# Patient Record
Sex: FEMALE | Race: WHITE | Employment: UNEMPLOYED | URBAN - METROPOLITAN AREA
[De-identification: names, ages, dates, MRNs, and addresses within clinical notes are randomized per-mention and may not be internally consistent; named-entity substitution may affect disease eponyms.]

---

## 2017-02-28 ENCOUNTER — OFFICE VISIT (OUTPATIENT)
Dept: DERMATOLOGY | Age: 72
End: 2017-02-28

## 2017-02-28 DIAGNOSIS — L82.1 SK (SEBORRHEIC KERATOSIS): ICD-10-CM

## 2017-02-28 DIAGNOSIS — Z85.828 HISTORY OF BASAL CELL CARCINOMA: ICD-10-CM

## 2017-02-28 DIAGNOSIS — T07.XXXA MULTIPLE EXCORIATIONS: ICD-10-CM

## 2017-02-28 DIAGNOSIS — L57.0 AK (ACTINIC KERATOSIS): ICD-10-CM

## 2017-02-28 DIAGNOSIS — L21.9 SEBORRHEIC DERMATITIS: Primary | ICD-10-CM

## 2017-02-28 PROCEDURE — 99213 OFFICE O/P EST LOW 20 MIN: CPT | Performed by: DERMATOLOGY

## 2017-02-28 PROCEDURE — 17000 DESTRUCT PREMALG LESION: CPT | Performed by: DERMATOLOGY

## 2017-06-29 ENCOUNTER — TELEPHONE (OUTPATIENT)
Dept: DERMATOLOGY | Age: 72
End: 2017-06-29

## 2017-06-29 RX ORDER — KETOCONAZOLE 20 MG/ML
SHAMPOO TOPICAL
Qty: 1 BOTTLE | Refills: 5 | Status: SHIPPED | OUTPATIENT
Start: 2017-06-29 | End: 2020-09-23

## 2017-08-24 ENCOUNTER — HOSPITAL ENCOUNTER (OUTPATIENT)
Dept: MAMMOGRAPHY | Age: 72
Discharge: HOME | End: 2017-08-24
Attending: INTERNAL MEDICINE | Admitting: INTERNAL MEDICINE

## 2017-08-24 DIAGNOSIS — Z12.31 VISIT FOR SCREENING MAMMOGRAM: ICD-10-CM

## 2018-02-12 ENCOUNTER — TELEPHONE (OUTPATIENT)
Dept: DERMATOLOGY | Age: 73
End: 2018-02-12

## 2018-02-12 NOTE — TELEPHONE ENCOUNTER
Pt c/b [phone]  Pt states:   - hx cancer bbc   - new spot rt below left eye   - couple weeks   - puffing up   - would like an appt asap   - nor available until Thursday this week  Please call to discuss thanks

## 2018-02-20 ENCOUNTER — OFFICE VISIT (OUTPATIENT)
Dept: DERMATOLOGY | Age: 73
End: 2018-02-20

## 2018-02-20 DIAGNOSIS — Z85.828 HISTORY OF BASAL CELL CARCINOMA: ICD-10-CM

## 2018-02-20 DIAGNOSIS — L57.0 AK (ACTINIC KERATOSIS): ICD-10-CM

## 2018-02-20 DIAGNOSIS — L21.9 SEBORRHEIC DERMATITIS: ICD-10-CM

## 2018-02-20 DIAGNOSIS — L82.1 SK (SEBORRHEIC KERATOSIS): Primary | ICD-10-CM

## 2018-02-20 PROCEDURE — 99213 OFFICE O/P EST LOW 20 MIN: CPT | Performed by: DERMATOLOGY

## 2018-02-20 PROCEDURE — 17000 DESTRUCT PREMALG LESION: CPT | Performed by: DERMATOLOGY

## 2018-02-20 RX ORDER — BUDESONIDE 0.5 MG/2ML
INHALANT ORAL
Refills: 1 | COMMUNITY
Start: 2018-02-02

## 2018-02-20 RX ORDER — FOLIC ACID 1 MG/1
TABLET ORAL
Refills: 11 | COMMUNITY
Start: 2018-02-06 | End: 2021-07-21

## 2018-02-20 RX ORDER — FLUOCINONIDE TOPICAL SOLUTION USP, 0.05% 0.5 MG/ML
SOLUTION TOPICAL
Qty: 60 ML | Refills: 3 | Status: SHIPPED | OUTPATIENT
Start: 2018-02-20 | End: 2020-09-23

## 2018-02-20 NOTE — PROGRESS NOTES
4. History of basal cell carcinoma - clear    Continue sun protective behaviors. Return in about 1 year (around 2/20/2019).

## 2018-09-18 ENCOUNTER — HOSPITAL ENCOUNTER (OUTPATIENT)
Dept: WOMENS IMAGING | Age: 73
Discharge: HOME | End: 2018-09-18
Payer: COMMERCIAL

## 2018-09-18 DIAGNOSIS — Z12.31 ENCOUNTER FOR SCREENING MAMMOGRAM FOR BREAST CANCER: ICD-10-CM

## 2018-09-18 PROCEDURE — 77063 BREAST TOMOSYNTHESIS BI: CPT

## 2019-02-27 ENCOUNTER — OFFICE VISIT (OUTPATIENT)
Dept: DERMATOLOGY | Age: 74
End: 2019-02-27
Payer: COMMERCIAL

## 2019-02-27 DIAGNOSIS — L82.0 INFLAMED SEBORRHEIC KERATOSIS: ICD-10-CM

## 2019-02-27 DIAGNOSIS — L57.0 AK (ACTINIC KERATOSIS): Primary | ICD-10-CM

## 2019-02-27 PROCEDURE — 17000 DESTRUCT PREMALG LESION: CPT | Performed by: DERMATOLOGY

## 2019-02-27 PROCEDURE — 17003 DESTRUCT PREMALG LES 2-14: CPT | Performed by: DERMATOLOGY

## 2019-02-27 PROCEDURE — 17110 DESTRUCTION B9 LES UP TO 14: CPT | Performed by: DERMATOLOGY

## 2019-02-27 RX ORDER — ATORVASTATIN CALCIUM 10 MG/1
5 TABLET, FILM COATED ORAL
COMMUNITY
Start: 2018-12-12 | End: 2021-07-21

## 2019-11-19 ENCOUNTER — HOSPITAL ENCOUNTER (OUTPATIENT)
Dept: WOMENS IMAGING | Age: 74
Discharge: HOME | End: 2019-11-19
Payer: COMMERCIAL

## 2019-11-19 DIAGNOSIS — Z12.31 SCREENING MAMMOGRAM, ENCOUNTER FOR: ICD-10-CM

## 2019-11-19 PROCEDURE — 77063 BREAST TOMOSYNTHESIS BI: CPT

## 2020-09-23 ENCOUNTER — OFFICE VISIT (OUTPATIENT)
Dept: DERMATOLOGY | Age: 75
End: 2020-09-23
Payer: MEDICARE

## 2020-09-23 VITALS — TEMPERATURE: 98.2 F

## 2020-09-23 PROCEDURE — 99213 OFFICE O/P EST LOW 20 MIN: CPT | Performed by: DERMATOLOGY

## 2020-09-23 NOTE — PROGRESS NOTES
[facility] Dermatology  Daiana Waldrop MD  [phone]      Adelaida Martin  1945    70 y.o. female     Date of Visit: 9/23/2020    Chief Complaint: skin lesions    History of Present Illness:    1. She complains of few persistent scaly lesions on the forehead and nose. None are bothersome. 2.  She also complains of a persistent asymptomatic lesion on the right hip. Derm Hx:  Seborrheic dermatitis of the scalp and ears   Lidex solution for the scalp  Hydrocortisone 2.5% cream for the ears     She has a history of BCCs - most recently on the L lower eyelid few years ago s/p Mohs surgery and plastics repair      Review of Systems:  Skin: No new or changing moles. Past Medical History, Family History, Surgical History, Medications and Allergies reviewed. Past Medical History:   Diagnosis Date    Allergic rhinitis     Anxiety and depression     BCC (basal cell carcinoma of skin)     Cancer (HCC)     left breast    Chronic interstitial cystitis     Hearing decreased     Neoplasm of breast     left    Reflux     Sarcoidosis     lungs-no treatment    Sjogrens syndrome ([address]     Wears glasses      Past Surgical History:   Procedure Laterality Date    BREAST SURGERY      excision left breast lesion    CATARACT REMOVAL WITH IMPLANT  8/2012    left eye    CYSTOSCOPY      EYE SURGERY      EYE SURGERY  9/11/2012     right eye cataract    HYSTERECTOMY      partial    MOHS SURGERY      X 3    SINUS SURGERY      TEAR DUCT SURGERY      basal cell removed       Allergies   Allergen Reactions    Codeine      headaches    Food Swelling     seafood    Pcn [Penicillins]     Sulfa Antibiotics     Vancomycin      flushing     Outpatient Medications Marked as Taking for the 9/23/20 encounter (Office Visit) with Janett Kulkarni MD   Medication Sig Dispense Refill    budesonide (PULMICORT) 0.5 MG/2ML nebulizer suspension INHALE 2 MLS BY NEBULIZATION ROUTE 2 (TWO) TIMES DAILY.   1    methotrexate (RHEUMATREX) 2.5 MG chemo tablet TAKE 2 TABLETS BY MOUTH ONE WEEKLY  11    folic acid (FOLVITE) 1 MG tablet TAKE 1 TABLET (1 MG TOTAL) BY MOUTH DAILY. 11    predniSONE (DELTASONE) 5 MG tablet Take 5 mg by mouth daily. Physical Examination       The following were examined and determined to be normal: Psych/Neuro, Scalp/hair, Conjunctivae/eyelids, Gums/teeth/lips, Neck, Breast/axilla/chest, Abdomen, Back, RUE, LUE, RLE, LLE and Nails/digits. The following were examined and determined to be abnormal: Head/face. Well appearing. 1.  Left forehead, proximal nasal dorsum - few skin colored scaly macules. 2.  Right hip - stuck on appearing oval shaped verrucous whitish tan plaque. Assessment and Plan     1. AK (actinic keratosis) - few    Observe. We discussed the relation to chronic cumulative sun exposure and the low premalignant potential.     Counseling regarding sun protective behaviors was performed including sun avoidance, hats and sunscreen. 2. SK (seborrheic keratosis)     Reassurance. Return in about 1 year (around 9/23/2021).

## 2021-07-20 ENCOUNTER — TELEPHONE (OUTPATIENT)
Dept: DERMATOLOGY | Age: 76
End: 2021-07-20

## 2021-07-20 NOTE — TELEPHONE ENCOUNTER
Called and left message for patient to call back office. It looks like Dr Zack Bolanos has prescribed her fluocinonide (LIDEX) 0.05 % external solution for this issue in the past.     Please ask patient if she recalls this medication working for her.

## 2021-07-20 NOTE — TELEPHONE ENCOUNTER
Pt calling states her scalp itches a lot want to know if anything can get called into the pharm for it she is scheduled on 9/27 pls return call to discuss

## 2021-07-21 ENCOUNTER — OFFICE VISIT (OUTPATIENT)
Dept: DERMATOLOGY | Age: 76
End: 2021-07-21
Payer: MEDICARE

## 2021-07-21 VITALS — TEMPERATURE: 97.3 F

## 2021-07-21 DIAGNOSIS — L57.0 AK (ACTINIC KERATOSIS): ICD-10-CM

## 2021-07-21 DIAGNOSIS — L82.1 SK (SEBORRHEIC KERATOSIS): ICD-10-CM

## 2021-07-21 DIAGNOSIS — L29.9 SCALP PRURITUS: Primary | ICD-10-CM

## 2021-07-21 PROCEDURE — 17003 DESTRUCT PREMALG LES 2-14: CPT | Performed by: DERMATOLOGY

## 2021-07-21 PROCEDURE — 99213 OFFICE O/P EST LOW 20 MIN: CPT | Performed by: DERMATOLOGY

## 2021-07-21 PROCEDURE — 17000 DESTRUCT PREMALG LESION: CPT | Performed by: DERMATOLOGY

## 2021-07-21 RX ORDER — CLOBETASOL PROPIONATE 0.46 MG/ML
SOLUTION TOPICAL
Qty: 50 ML | Refills: 0 | Status: SHIPPED | OUTPATIENT
Start: 2021-07-21

## 2021-07-21 NOTE — PROGRESS NOTES
[facility] Dermatology  Eleni New MD  [phone]      Alannah Chanel  1945    78 y.o. female     Date of Visit: 7/21/2021    Chief Complaint: scalp issue, skin lesions    History of Present Illness:    1. She presents today for a 2-week history of scalp pruritus. 2.  She also complains of several persistent scaly lesions on the face. 3.  She complains of a growth on the left anterior temporal scalp. Has hx of BCCs      Review of Systems:  Gen: Feels well, good sense of health. Past Medical History, Family History, Surgical History, Medications and Allergies reviewed. Past Medical History:   Diagnosis Date    Allergic rhinitis     Anxiety and depression     BCC (basal cell carcinoma of skin)     Cancer (HCC)     left breast    Chronic interstitial cystitis     Hearing decreased     Neoplasm of breast     left    Reflux     Sarcoidosis     lungs-no treatment    Sjogrens syndrome ([address]     Wears glasses      Past Surgical History:   Procedure Laterality Date    BREAST SURGERY      excision left breast lesion    CATARACT REMOVAL WITH IMPLANT  8/2012    left eye    CYSTOSCOPY      EYE SURGERY      EYE SURGERY  9/11/2012     right eye cataract    HYSTERECTOMY      partial    MOHS SURGERY      X 3    SINUS SURGERY      TEAR DUCT SURGERY      basal cell removed       Allergies   Allergen Reactions    Codeine      headaches    Food Swelling     seafood    Pcn [Penicillins]     Sulfa Antibiotics     Vancomycin      flushing     Outpatient Medications Marked as Taking for the 7/21/21 encounter (Office Visit) with Suma Smith MD   Medication Sig Dispense Refill    budesonide (PULMICORT) 0.5 MG/2ML nebulizer suspension INHALE 2 MLS BY NEBULIZATION ROUTE 2 (TWO) TIMES DAILY. 1    predniSONE (DELTASONE) 5 MG tablet Take 5 mg by mouth daily.            Physical Examination       The following were examined and determined to be normal: Psych/Neuro, Conjunctivae/eyelids, Gums/teeth/lips and Neck. The following were examined and determined to be abnormal: Scalp/hair and Head/face. Well appearing. 1.  Scalp with few excoriations. 2.  Right lower forehead - 2, right medial lower cheek - 2, left lower forehead - 2, left cheek - 1: ill defined keratotic pink macules. 3.  Left anterior temporal scalp - stuck on appearing verrucous light brown plaque. Assessment and Plan     1. Scalp pruritus - ? Underlying dermatitis    Clobetasol solution daily for 1 to 2 weeks until improved. 2. AK (actinic keratosis)     2 cycles of liquid nitrogen applied to 7 AKs: Right lower forehead - 2, right medial lower cheek - 2, left lower forehead - 2, left cheek - 1. Patient was educated regarding the potential risks of blister formation and discomfort. Wound care was discussed. 3. SK (seborrheic keratosis)     Reassurance. Return in about 6 months (around 1/21/2022).     --Jackee Gosselin, MD

## 2021-08-24 ENCOUNTER — HOSPITAL ENCOUNTER (OUTPATIENT)
Dept: WOMENS IMAGING | Age: 76
Discharge: HOME | End: 2021-08-24
Payer: MEDICARE

## 2021-08-24 DIAGNOSIS — Z12.31 SCREENING MAMMOGRAM, ENCOUNTER FOR: ICD-10-CM

## 2021-08-24 PROCEDURE — 77063 BREAST TOMOSYNTHESIS BI: CPT

## 2023-01-24 ENCOUNTER — OFFICE VISIT (OUTPATIENT)
Dept: DERMATOLOGY | Age: 78
End: 2023-01-24
Payer: MEDICARE

## 2023-01-24 DIAGNOSIS — L82.1 SK (SEBORRHEIC KERATOSIS): ICD-10-CM

## 2023-01-24 DIAGNOSIS — L57.0 AK (ACTINIC KERATOSIS): Primary | ICD-10-CM

## 2023-01-24 DIAGNOSIS — L21.9 SEBORRHEIC DERMATITIS: ICD-10-CM

## 2023-01-24 DIAGNOSIS — Z85.828 HISTORY OF BASAL CELL CARCINOMA: ICD-10-CM

## 2023-01-24 PROCEDURE — 1123F ACP DISCUSS/DSCN MKR DOCD: CPT | Performed by: DERMATOLOGY

## 2023-01-24 PROCEDURE — 99213 OFFICE O/P EST LOW 20 MIN: CPT | Performed by: DERMATOLOGY

## 2023-01-24 PROCEDURE — 17000 DESTRUCT PREMALG LESION: CPT | Performed by: DERMATOLOGY

## 2023-01-24 PROCEDURE — 17003 DESTRUCT PREMALG LES 2-14: CPT | Performed by: DERMATOLOGY

## 2023-01-24 RX ORDER — CITALOPRAM 40 MG/1
TABLET ORAL
COMMUNITY
Start: 2023-01-09

## 2023-01-24 RX ORDER — ALBUTEROL SULFATE 90 UG/1
AEROSOL, METERED RESPIRATORY (INHALATION)
COMMUNITY
Start: 2022-11-01

## 2023-01-24 NOTE — PROGRESS NOTES
[facility] Dermatology  Colleen Montejo MD  [phone]      Nonda   1945    78 y.o. female     Date of Visit: 1/24/2023    Chief Complaint: skin lesions    History of Present Illness:    1. She reports few persistent scaly lesions on the cheeks. 2.  She has asymptomatic scaling in the ears. 3.  She has several asymptomatic growths on the back. 4.  Has hx of BCCs - denies any signs of recurrence. Review of Systems:  Gen: Feels well, good sense of health. Past Medical History, Family History, Surgical History, Medications and Allergies reviewed.     Past Medical History:   Diagnosis Date    Allergic rhinitis     Anxiety and depression     BCC (basal cell carcinoma of skin)     Breast cancer (HCC)     Cancer (HCC)     left breast    Chronic interstitial cystitis     Hearing decreased     Neoplasm of breast     left    Reflux     Sarcoidosis     lungs-no treatment    Sjogrens syndrome ([address]     Wears glasses      Past Surgical History:   Procedure Laterality Date    BREAST BIOPSY      BREAST LUMPECTOMY      BREAST SURGERY      excision left breast lesion    CATARACT REMOVAL WITH IMPLANT  8/2012    left eye    CYSTOSCOPY      EYE SURGERY      EYE SURGERY  9/11/2012     right eye cataract    HYSTERECTOMY (CERVIX STATUS UNKNOWN)      partial    MOHS SURGERY      X 3    SINUS SURGERY      TEAR DUCT SURGERY      basal cell removed       Allergies   Allergen Reactions    Codeine      headaches    Food Swelling     seafood    Pcn [Penicillins]     Sulfa Antibiotics     Vancomycin      flushing     Outpatient Medications Marked as Taking for the 1/24/23 encounter (Office Visit) with Yadira Osborne MD   Medication Sig Dispense Refill    Zoledronic Acid (RECLAST IV) Infuse intravenously      albuterol sulfate HFA (PROVENTIL;VENTOLIN;PROAIR) 108 (90 Base) MCG/ACT inhaler INHALE 2 PUFFS INTO THE LUNGS EVERY 6 HOURS AS NEEDED FOR WHEEZING OR SHORTNESS OF BREATH      citalopram (CELEXA) 40 MG tablet TAKE 1 TABLET BY MOUTH EVERY DAY      Probiotic Product (PROBIOTIC PO) Take by mouth      predniSONE (DELTASONE) 5 MG tablet Take 5 mg by mouth daily. Physical Examination       The following were examined and determined to be normal: Psych/Neuro, Conjunctivae/eyelids, Gums/teeth/lips, Neck, Breast/axilla/chest, Abdomen, Back, RUE, and LUE. The following were examined and determined to be abnormal: Scalp/hair and Head/face. Well appearing. 1.  Right cheek - 2, left cheek - 1: ill defined keratotic pink macules. 2.  Lena of the ears with mild greasy scaling. 3.  Back with stuck-on appearing pink-tan verrucous papules and plaques. Assessment and Plan     1. AK (actinic keratosis) - 3    Cryotherapy was discussed and patient agreed to proceed. Consent was obtained. 3 lesions were treated cryotherapy: Right cheek - 2, left cheek - 1. 2 cycles of liquid nitrogen applied to each lesion for 5 seconds using a Cry-Ac cryo spray gun. Patient was educated regarding the potential risks of blister formation and discomfort. Wound care was discussed. The patient tolerated the procedure well and there were no immediate complications. 2. Seborrheic dermatitis of the ears - very mild, asymptomatic     Observe. Consider hydrocortisone 2.5% lotion or cream in the future if worsens or becomes bothersome. 3. SK (seborrheic keratosis)     Reassurance. 4. History of basal cell carcinomas - clear    Sun protective behaviors, including use of at least SPF 30 sunscreen, and self skin examinations were encouraged. Call for any new or concerning lesions. Return in about 1 year (around 1/24/2024).     --Laura Wooten MD

## 2023-03-09 ENCOUNTER — HOSPITAL ENCOUNTER (OUTPATIENT)
Dept: WOMENS IMAGING | Age: 78
Discharge: HOME | End: 2023-03-09
Payer: MEDICARE

## 2023-03-09 DIAGNOSIS — Z12.31 ENCOUNTER FOR SCREENING MAMMOGRAM FOR BREAST CANCER: ICD-10-CM

## 2023-03-09 PROCEDURE — 77067 SCR MAMMO BI INCL CAD: CPT

## 2023-08-24 ENCOUNTER — TELEPHONE (OUTPATIENT)
Dept: DERMATOLOGY | Age: 78
End: 2023-08-24

## 2023-08-24 NOTE — TELEPHONE ENCOUNTER
Patient scheduled for 8/29/23 at 4pm.
Pt calling states she has another spot that has popped up on her scalp have some concern pls return call back @ [phone]
Applied

## 2023-08-29 ENCOUNTER — OFFICE VISIT (OUTPATIENT)
Dept: DERMATOLOGY | Age: 78
End: 2023-08-29
Payer: MEDICARE

## 2023-08-29 DIAGNOSIS — L21.9 SEBORRHEIC DERMATITIS: Primary | ICD-10-CM

## 2023-08-29 DIAGNOSIS — L82.1 SK (SEBORRHEIC KERATOSIS): ICD-10-CM

## 2023-08-29 PROCEDURE — 99213 OFFICE O/P EST LOW 20 MIN: CPT | Performed by: DERMATOLOGY

## 2023-08-29 PROCEDURE — 1123F ACP DISCUSS/DSCN MKR DOCD: CPT | Performed by: DERMATOLOGY

## 2023-08-29 RX ORDER — DOXYCYCLINE HYCLATE 100 MG/1
100 CAPSULE ORAL 2 TIMES DAILY
COMMUNITY
Start: 2023-07-20

## 2023-08-29 RX ORDER — CLOBETASOL PROPIONATE 0.46 MG/ML
SOLUTION TOPICAL
Qty: 50 ML | Refills: 2 | Status: SHIPPED | OUTPATIENT
Start: 2023-08-29

## 2023-08-29 RX ORDER — COVID-19 ANTIGEN TEST
KIT MISCELLANEOUS
COMMUNITY

## 2023-08-29 RX ORDER — METHOCARBAMOL 500 MG/1
500 TABLET, FILM COATED ORAL
COMMUNITY

## 2023-08-29 RX ORDER — BENZONATATE 200 MG/1
200 CAPSULE ORAL 2 TIMES DAILY PRN
COMMUNITY
Start: 2017-08-01

## 2023-08-29 NOTE — PROGRESS NOTES
[facility] Dermatology  Jenaro Cuellar MD  [phone]      James Nye  1945    70 y.o. female     Date of Visit: 8/29/2023    Chief Complaint: skin lesions    History of Present Illness:    1. She complains of a pruritic eruption on the back of the scalp. 2.  She reports a growth on the left side of the scalp. Review of Systems:  Gen: Feels well, good sense of health. Past Medical History, Family History, Surgical History, Medications and Allergies reviewed.     Past Medical History:   Diagnosis Date    Allergic rhinitis     Anxiety and depression     BCC (basal cell carcinoma of skin)     Breast cancer (HCC)     Cancer (HCC)     left breast    Chronic interstitial cystitis     Hearing decreased     Neoplasm of breast     left    Reflux     Sarcoidosis     lungs-no treatment    Sjogrens syndrome ([address])     Wears glasses      Past Surgical History:   Procedure Laterality Date    BREAST BIOPSY      BREAST LUMPECTOMY      BREAST SURGERY      excision left breast lesion    CATARACT REMOVAL WITH IMPLANT  8/2012    left eye    CYSTOSCOPY      EYE SURGERY      EYE SURGERY  9/11/2012     right eye cataract    HYSTERECTOMY (CERVIX STATUS UNKNOWN)      partial    MOHS SURGERY      X 3    SINUS SURGERY      TEAR DUCT SURGERY      basal cell removed       Allergies   Allergen Reactions    Codeine      headaches    Food Swelling     seafood    Pcn [Penicillins]     Sulfa Antibiotics     Vancomycin      flushing     Outpatient Medications Marked as Taking for the 8/29/23 encounter (Office Visit) with Lou Coronado MD   Medication Sig Dispense Refill    Naproxen Sodium 220 MG CAPS Take by mouth      benzonatate (TESSALON) 200 MG capsule Take 1 capsule by mouth 2 times daily as needed      doxycycline hyclate (VIBRAMYCIN) 100 MG capsule Take 1 capsule by mouth 2 times daily      methocarbamol (ROBAXIN) 500 MG tablet Take 1 tablet by mouth      Zoledronic Acid (RECLAST IV) Infuse intravenously

## 2024-06-05 ENCOUNTER — OFFICE VISIT (OUTPATIENT)
Dept: DERMATOLOGY | Age: 79
End: 2024-06-05
Payer: MEDICARE

## 2024-06-05 DIAGNOSIS — L40.9 SCALP PSORIASIS: Primary | ICD-10-CM

## 2024-06-05 PROCEDURE — 11900 INJECT SKIN LESIONS </W 7: CPT | Performed by: DERMATOLOGY

## 2024-06-05 PROCEDURE — 1123F ACP DISCUSS/DSCN MKR DOCD: CPT | Performed by: DERMATOLOGY

## 2024-06-05 PROCEDURE — 99213 OFFICE O/P EST LOW 20 MIN: CPT | Performed by: DERMATOLOGY

## 2024-06-05 RX ORDER — CLOBETASOL PROPIONATE 0.46 MG/ML
SOLUTION TOPICAL
Qty: 50 ML | Refills: 2 | Status: SHIPPED | OUTPATIENT
Start: 2024-06-05

## 2024-06-05 NOTE — PROGRESS NOTES
[facility] Dermatology  Brannon Luna MD  [phone]      Allison Ward  1945    78 y.o. female     Date of Visit: 6/5/2024    Chief Complaint: scalp issue    History of Present Illness:    1.  She returns today to follow up for chronic seborrheic dermatitis of the occipital scalp - worse since last visit with an increase in itching.  Has been using clobetasol solution with some improvement in pruritus.        Review of Systems:  Gen: Feels well, good sense of health.    Past Medical History, Family History, Surgical History, Medications and Allergies reviewed.    Past Medical History:   Diagnosis Date    Allergic rhinitis     Anxiety and depression     BCC (basal cell carcinoma of skin)     Breast cancer (HCC)     Cancer (HCC)     left breast    Chronic interstitial cystitis     Hearing decreased     Neoplasm of breast     left    Reflux     Sarcoidosis     lungs-no treatment    Sjogrens syndrome (HCC)     Wears glasses      Past Surgical History:   Procedure Laterality Date    BREAST BIOPSY      BREAST LUMPECTOMY      BREAST SURGERY      excision left breast lesion    CATARACT REMOVAL WITH IMPLANT  8/2012    left eye    CYSTOSCOPY      EYE SURGERY      EYE SURGERY  9/11/2012     right eye cataract    HYSTERECTOMY (CERVIX STATUS UNKNOWN)      partial    MOHS SURGERY      X 3    SINUS SURGERY      TEAR DUCT SURGERY      basal cell removed       Allergies   Allergen Reactions    Codeine      headaches    Food Swelling     seafood    Pcn [Penicillins]     Sulfa Antibiotics     Vancomycin      flushing     Outpatient Medications Marked as Taking for the 6/5/24 encounter (Office Visit) with Brannon Luna MD   Medication Sig Dispense Refill    predniSONE (DELTASONE) 5 MG tablet Take 1 tablet by mouth daily         Physical Examination       Well appearing.    1.  Occipital scalp with well defined coarsely scaly erythematous plaque.       Assessment and Plan     1. Scalp psoriasis with prominent

## 2024-09-23 ENCOUNTER — OFFICE VISIT (OUTPATIENT)
Dept: DERMATOLOGY | Age: 79
End: 2024-09-23
Payer: MEDICARE

## 2024-09-23 DIAGNOSIS — L40.9 SCALP PSORIASIS: Primary | ICD-10-CM

## 2024-09-23 PROCEDURE — 11900 INJECT SKIN LESIONS </W 7: CPT | Performed by: DERMATOLOGY

## 2024-09-23 PROCEDURE — 99213 OFFICE O/P EST LOW 20 MIN: CPT | Performed by: DERMATOLOGY

## 2024-09-23 PROCEDURE — 1123F ACP DISCUSS/DSCN MKR DOCD: CPT | Performed by: DERMATOLOGY

## 2024-09-23 RX ORDER — FLUOCINOLONE ACETONIDE 0.11 MG/ML
OIL TOPICAL
Qty: 120 ML | Refills: 1 | Status: SHIPPED | OUTPATIENT
Start: 2024-09-23

## 2025-02-10 ENCOUNTER — TELEPHONE (OUTPATIENT)
Age: 80
End: 2025-02-10

## 2025-02-10 NOTE — TELEPHONE ENCOUNTER
Allison wants to reschedule her appt from 2/11 due to the weather.  When could she come back in?  She doesn't want to wait til the fall.  [phone]

## 2025-02-25 ENCOUNTER — OFFICE VISIT (OUTPATIENT)
Age: 80
End: 2025-02-25
Payer: MEDICARE

## 2025-02-25 DIAGNOSIS — L40.9 SCALP PSORIASIS: Primary | ICD-10-CM

## 2025-02-25 DIAGNOSIS — L82.1 SK (SEBORRHEIC KERATOSIS): ICD-10-CM

## 2025-02-25 PROCEDURE — 99214 OFFICE O/P EST MOD 30 MIN: CPT | Performed by: DERMATOLOGY

## 2025-02-25 PROCEDURE — 1160F RVW MEDS BY RX/DR IN RCRD: CPT | Performed by: DERMATOLOGY

## 2025-02-25 PROCEDURE — 1123F ACP DISCUSS/DSCN MKR DOCD: CPT | Performed by: DERMATOLOGY

## 2025-02-25 PROCEDURE — 1159F MED LIST DOCD IN RCRD: CPT | Performed by: DERMATOLOGY

## 2025-02-25 RX ORDER — FLUOCINOLONE ACETONIDE 0.11 MG/ML
OIL TOPICAL
Qty: 120 ML | Refills: 1 | Status: SHIPPED | OUTPATIENT
Start: 2025-02-25

## 2025-02-25 NOTE — PATIENT INSTRUCTIONS
Neutrogena T/Sal shampoo  Start use of fluocinolone oil few times per week and wash off in the morning.

## 2025-02-25 NOTE — PROGRESS NOTES
[facility] Dermatology  Brannon Luna MD  [phone]      Allison Ward  1945    79 y.o. female     Date of Visit: 2/25/2025    Chief Complaint: scalp psoriasis    History of Present Illness:    Here today to follow up for scalp psoriasis.  Previously had improvement with intralesional Kenalog.  She reports that it has worsened since last visit.    Did not fill Rx for Derma-Smooth FS oil.      She reports a growth below the left eye.    Notable PMHx:   On prednisone for hx of pulmonary sarcoidosis with fibrotic lung disease and bronchiectasis.       Review of Systems:  Gen: Feels well, good sense of health.      Past Medical History, Family History, Surgical History, Medications and Allergies reviewed.    Past Medical History:   Diagnosis Date    Allergic rhinitis     Anxiety and depression     BCC (basal cell carcinoma of skin)     Breast cancer (HCC)     Cancer (HCC)     left breast    Chronic interstitial cystitis     Hearing decreased     Neoplasm of breast     left    Reflux     Sarcoidosis     lungs-no treatment    Sjogrens syndrome     Wears glasses      Past Surgical History:   Procedure Laterality Date    BREAST BIOPSY      BREAST LUMPECTOMY      BREAST SURGERY      excision left breast lesion    CATARACT REMOVAL WITH IMPLANT  8/2012    left eye    CYSTOSCOPY      EYE SURGERY      EYE SURGERY  9/11/2012     right eye cataract    HYSTERECTOMY (CERVIX STATUS UNKNOWN)      partial    MOHS SURGERY      X 3    SINUS SURGERY      TEAR DUCT SURGERY      basal cell removed       Allergies   Allergen Reactions    Codeine      headaches    Food Swelling     seafood    Pcn [Penicillins]     Sulfa Antibiotics     Vancomycin      flushing     Outpatient Medications Marked as Taking for the 2/25/25 encounter (Office Visit) with Brannon Luna MD   Medication Sig Dispense Refill    fluocinolone (DERMA-SMOOTHE) 0.01 % external oil Apply to the scalp daily until improved.  Wash hair in the morning.

## 2025-03-18 ENCOUNTER — HOSPITAL ENCOUNTER (OUTPATIENT)
Dept: WOMENS IMAGING | Age: 80
Discharge: HOME | End: 2025-03-18
Payer: MEDICARE

## 2025-03-18 VITALS — BODY MASS INDEX: 20.49 KG/M2 | WEIGHT: 120 LBS | HEIGHT: 64 IN

## 2025-03-18 DIAGNOSIS — Z12.31 SCREENING MAMMOGRAM, ENCOUNTER FOR: ICD-10-CM

## 2025-03-18 PROCEDURE — 77063 BREAST TOMOSYNTHESIS BI: CPT

## 2025-04-03 ENCOUNTER — OFFICE VISIT (OUTPATIENT)
Age: 80
End: 2025-04-03

## 2025-04-03 DIAGNOSIS — L40.9 SCALP PSORIASIS: Primary | ICD-10-CM

## 2025-04-03 RX ORDER — FLUOCINOLONE ACETONIDE 0.11 MG/ML
OIL TOPICAL
Qty: 120 ML | Refills: 1 | Status: SHIPPED | OUTPATIENT
Start: 2025-04-03

## 2025-04-03 NOTE — PROGRESS NOTES
[facility] Dermatology  Brannon Luna MD  [phone]      Allison Ward  1945    79 y.o. female     Date of Visit: 4/3/2025    Chief Complaint: scalp psoriasis    History of Present Illness:    She returns today to follow-up for chronic psoriasis of the scalp with occipital and right posterior parietal involvement.  She was prescribed Derma-Smoothe FS oil but is only used infrequently.  She uses clobetasol intermittently with some improvement.    Has pulmonary sarcoidosis with fibrotic lung disease.   On prednisone 5 mg daily.     Remote history of breast cancer.        Review of Systems:  Gen: Feels well, good sense of health.      Past Medical History, Family History, Surgical History, Medications and Allergies reviewed.    Past Medical History:   Diagnosis Date    Allergic rhinitis     Anxiety and depression     BCC (basal cell carcinoma of skin)     Breast cancer     Cancer (HCC)     left breast    Chronic interstitial cystitis     Hearing decreased     Neoplasm of breast     left    Reflux     Sarcoidosis     lungs-no treatment    Sjogrens syndrome     Wears glasses      Past Surgical History:   Procedure Laterality Date    BREAST BIOPSY      BREAST LUMPECTOMY      BREAST SURGERY      excision left breast lesion    CATARACT REMOVAL WITH IMPLANT  8/2012    left eye    CYSTOSCOPY      EYE SURGERY      EYE SURGERY  9/11/2012     right eye cataract    HYSTERECTOMY (CERVIX STATUS UNKNOWN)      partial    MOHS SURGERY      X 3    SINUS SURGERY      TEAR DUCT SURGERY      basal cell removed       Allergies   Allergen Reactions    Codeine      headaches    Food Swelling     seafood    Pcn [Penicillins]     Sulfa Antibiotics     Vancomycin      flushing     Outpatient Medications Marked as Taking for the 4/3/25 encounter (Office Visit) with Brannon Luna MD   Medication Sig Dispense Refill    fluocinolone (DERMA-SMOOTHE) 0.01 % external oil Apply to the scalp daily until improved.  Wash hair in

## 2025-05-28 ENCOUNTER — OFFICE VISIT (OUTPATIENT)
Age: 80
End: 2025-05-28
Payer: MEDICARE

## 2025-05-28 DIAGNOSIS — L40.9 SCALP PSORIASIS: Primary | ICD-10-CM

## 2025-05-28 PROCEDURE — 1123F ACP DISCUSS/DSCN MKR DOCD: CPT | Performed by: DERMATOLOGY

## 2025-05-28 PROCEDURE — 1160F RVW MEDS BY RX/DR IN RCRD: CPT | Performed by: DERMATOLOGY

## 2025-05-28 PROCEDURE — 99213 OFFICE O/P EST LOW 20 MIN: CPT | Performed by: DERMATOLOGY

## 2025-05-28 PROCEDURE — 1159F MED LIST DOCD IN RCRD: CPT | Performed by: DERMATOLOGY

## 2025-05-28 RX ORDER — FLUOCINOLONE ACETONIDE 0.11 MG/ML
OIL TOPICAL
Qty: 120 ML | Refills: 1 | Status: SHIPPED | OUTPATIENT
Start: 2025-05-28

## 2025-05-28 NOTE — PROGRESS NOTES
[facility] Dermatology  Brannon Luna MD  [phone]      Allison Ward  1945    79 y.o. female     Date of Visit: 5/28/2025    Chief Complaint: psoriasis    History of Present Illness:    She returns today to follow-up for chronic scalp psoriasis affecting the occipital, crown left parietal portions of the scalp.  Affected areas were injected with intralesional Kenalog 5 mg/mL at last visit nearly 2 months ago.  She also began using Derma-Smooth FS oil with improvement.    Derma-Smoothe FS oil 2 times weekly.  Clobetasol solution on the days she is not using Derma-Smoothe FS oil.    Has pulmonary sarcoidosis with fibrotic lung disease.   On prednisone 5 mg daily.      Remote history of breast cancer.        Review of Systems:  Gen: Feels well, good sense of health.    Past Medical History, Family History, Surgical History, Medications and Allergies reviewed.    Past Medical History:   Diagnosis Date    Allergic rhinitis     Anxiety and depression     BCC (basal cell carcinoma of skin)     Breast cancer (HCC)     Cancer (HCC)     left breast    Chronic interstitial cystitis     Hearing decreased     Neoplasm of breast     left    Reflux     Sarcoidosis     lungs-no treatment    Sjogrens syndrome     Wears glasses      Past Surgical History:   Procedure Laterality Date    BREAST BIOPSY      BREAST LUMPECTOMY      BREAST SURGERY      excision left breast lesion    CATARACT REMOVAL WITH IMPLANT  8/2012    left eye    CYSTOSCOPY      EYE SURGERY      EYE SURGERY  9/11/2012     right eye cataract    HYSTERECTOMY (CERVIX STATUS UNKNOWN)      partial    MOHS SURGERY      X 3    SINUS SURGERY      TEAR DUCT SURGERY      basal cell removed       Allergies   Allergen Reactions    Codeine      headaches    Food Swelling     seafood    Pcn [Penicillins]     Sulfa Antibiotics     Vancomycin      flushing     Outpatient Medications Marked as Taking for the 5/28/25 encounter (Office Visit) with Brannon Luna